# Patient Record
Sex: MALE | Race: WHITE | Employment: FULL TIME | URBAN - METROPOLITAN AREA
[De-identification: names, ages, dates, MRNs, and addresses within clinical notes are randomized per-mention and may not be internally consistent; named-entity substitution may affect disease eponyms.]

---

## 2022-01-29 ENCOUNTER — OFFICE VISIT (OUTPATIENT)
Dept: URGENT CARE | Facility: URGENT CARE | Age: 27
End: 2022-01-29

## 2022-01-29 VITALS
WEIGHT: 175 LBS | TEMPERATURE: 98.3 F | HEART RATE: 62 BPM | OXYGEN SATURATION: 97 % | DIASTOLIC BLOOD PRESSURE: 76 MMHG | SYSTOLIC BLOOD PRESSURE: 116 MMHG | RESPIRATION RATE: 16 BRPM

## 2022-01-29 DIAGNOSIS — L30.9 DERMATITIS: ICD-10-CM

## 2022-01-29 DIAGNOSIS — B35.4 TINEA CORPORIS: Primary | ICD-10-CM

## 2022-01-29 PROCEDURE — 99203 OFFICE O/P NEW LOW 30 MIN: CPT | Performed by: FAMILY MEDICINE

## 2022-01-29 RX ORDER — KETOCONAZOLE 20 MG/G
CREAM TOPICAL 2 TIMES DAILY
Qty: 60 G | Refills: 0 | Status: SHIPPED | OUTPATIENT
Start: 2022-01-29

## 2022-01-29 RX ORDER — TRIAMCINOLONE ACETONIDE 1 MG/G
CREAM TOPICAL 2 TIMES DAILY
Qty: 30 G | Refills: 0 | Status: SHIPPED | OUTPATIENT
Start: 2022-01-29

## 2022-01-29 NOTE — PROGRESS NOTES
SUBJECTIVE:  Chief Complaint   Patient presents with     Derm Problem     rash on back of left upper armx several days, also under his nose and right arm x 10 day - itchy, redness, blotchy     Andrew Moran is a 26 year old male who presents with a chief complaint of rash on arms, nose.    Rash on left upper arm for the past 1 month, has gotten slightly larger.  Has not tried anything except lotion.  Endorsed itchiness.    Developed forearm itchiness and small red spots - these have been present for the past 1 week.  Unsure of any exposure.  Has not tried anything on forearms.    Also has rash around mouth and underneath left nose - present for the past 2 weeks.  Denies any new food products.  Mild itchiness.  Does get a little drier but denies any yellowed mattering    No prior skin issues.    History reviewed. No pertinent past medical history.  No current outpatient medications on file.     Social History     Tobacco Use     Smoking status: Never Smoker     Smokeless tobacco: Never Used   Substance Use Topics     Alcohol use: Yes       ROS:  Review of systems negative except as stated above.    EXAM:   /76 (BP Location: Right arm, Patient Position: Chair, Cuff Size: Adult Regular)   Pulse 62   Temp 98.3  F (36.8  C) (Oral)   Resp 16   Wt 79.4 kg (175 lb)   SpO2 97%   GENERAL APPEARANCE: healthy, alert and no distress  EXTREMITIES: peripheral pulses normal  SKIN: left upper arm - lateral aspect with 2 irregular/oval patches with slightly raised redden border with central clearing and slight scaling.  Bilateral forearms with faint pink macules, scattered with no scaling or vesicles/pustules.  Face - redden patch below left nose/upper lip, smaller similar redden patches below lower lip, slight rough/scaling, no yellowed crusting/mattering, no vesicles or blistering  PSYCH: alert, affect bright      ASSESSMENT/PLAN:   (B35.4) Tinea corporis  (primary encounter diagnosis)  Comment: left upper arm  Plan:  ketoconazole (NIZORAL) 2 % external cream            (L30.9) Dermatitis  Comment: perioral/face  Plan: ketoconazole (NIZORAL) 2 % external cream,         triamcinolone (KENALOG) 0.1 % external cream            Discussed rash concerns and reviewed that left upper arm most likely fungal etiology - RX ketoconazole cream to area.  Reviewed perioral/face most likely dermatitis and recommend to monitor in regards to triggers that may have caused this, RX triamcinolone cream given to apply to area and reviewed that may also benefit from antifungal cream too - okay to apply ketoconazole cream to rash around mouth/face.    Reassurance given in regards to forearm rash, monitor for now.  No concerns for infection/wounds and minimal irritant appearance.  Okay for benadryl to help with itchiness, otherwise okay to take claritin or zyrtec.    Follow up with primary provider if no improvement of symptoms in 2 weeks    Jitendra Sheets MD  January 29, 2022 1:05 PM

## 2022-01-29 NOTE — PATIENT INSTRUCTIONS
"Okay to take benadryl 50 mg every 6 hours as needed for itchiness    Take zyrtec or claritin once a day for \"allergies\"    Apply topical antifungal to rash on left upper arm and face  Okay to apply topical steroid to rash on face    Avoid triggers if able to identify      Patient Education     Ringworm of the Skin     Ringworm is a fungal infection of the skin. Despite the name, a worm doesn't cause it. The cause of ringworm is a fungus that infects the outer layers of the skin.  The medical term for ringworm is tinea. It can affect most parts of your body, although it seems to do better in moist areas of the body and around hair. It can be on almost any part of your body, including:    Arms, hands, legs, chest, feet, and back    Scalp    Beard    Groin    Between the toes  Depending on where it is located, sometimes the name changes. For example:    Tinea capitis (scalp)    Tinea cruris (groin)    Tinea corporis (body)    Tinea pedis (feet)  Causes  Ringworm is very common all over the world, including the [place] It can take less than 1 week up to 2 weeks before you develop the infection after being exposed. So, you may not figure out the exact cause.  It's spread through direct contact with:    An infected person or animal    Infected soil, or objects such as towels, clothing, and moses  Symptoms  At first you might not notice ringworm. Or you may just see a small, red, often raised itchy spot or pimple. Sometimes there may only be one spot. At other times there may be several. Ringworm can look slightly different on different parts of the body, but there are some things are always present:    Irregular, round, oval or ring-shaped, which is why it's called ringworm    Clearer or lighter color at the center, since it spreads from the center of the spot outward    Red or inflamed look    Raised    Itchy    Scaly, dry, or flaky  Home care  Follow these tips to help care for yourself at home:    Leave it alone. Don't " scratch at the rash or pick it. This can increase the chance of infection and scarring.    Take medicine as prescribed. If you were prescribed a cream, apply it exactly as directed. Make sure to put the cream not just on the rash, but also on the skin 1 or 2 inches around it. Medicine by mouth is sometimes needed, particularly for ringworm on the scalp. Take it as directed and until your healthcare provider says to stop. Some ringworm creams are now available without a prescription (over the counter). Talk with your healthcare provider about these, as they may be just as effective but less expensive than prescription medicines in some cases.    Keep it from spreading to others.  Untreated ringworm of the skin is contagious by skin-to-skin contact. An affected child may return to school 2 days after treatment has started.  Prevention  To some degree, prevention depends on what part of your body was affected. In general, the following good hygiene can help.    Clean up after you get dirty or sweaty, or after using a locker room.    When possible, don t share moses and brushes.    Avoid having your skin and feet wet or damp for long periods.    Wear clean, loose-fitting underwear.  Follow-up care  Follow up with your healthcare provider as advised by our staff if the rash does not improve after 10 days of treatment or if the rash spreads to other areas of the body.  When to seek medical care  Call your healthcare provider right away if any of these occur:    Redness around the rash gets worse    Fluid drains from the rash    Fever of 100.4 F (38 C) or higher, or as directed by your healthcare provider  Darren last reviewed this educational content on 8/1/2019 2000-2021 [employer]. All rights reserved. This information is not intended as a substitute for professional medical care. Always follow your healthcare professional's instructions.           Patient Education     Managing Atopic Dermatitis  (Eczema)     After bathing, gently pat your skin dry (don t rub). Apply moisturizer while your skin is still damp.   To manage your symptoms and help reduce the severity and frequency, try these self-care tips:   Caring for your skin    Use a gentle, fragrance-free cleanser (or soap substitute cleanser) for bathing. Rinse well. Pat skin dry.    Take warm, not hot, baths or showers. Try to limit them to no more that 10 to 15 minutes.     Use moisturizer liberally right after you bathe, while your skin is still damp.    Try not to scratch because it will cause more damage to your skin.     Topical, over-the-counter hydrocortisone cream may help control mild symptoms.     Controlling your environment    Stay out of extreme heat or cold.    Stay out of very humid or very dry air.    If your home or office air is very dry, use a humidifier.    Stay away from allergens such as dust that may be in bedding, carpets, plush toys, or rugs.    Know that pet hair and dander can cause flare-ups.    Seeking medical treatment  Another way to keep symptoms under control is to seek medical treatment. Talk with your healthcare provider about the type of treatment that may work best for you. Your provider may prescribe treatments such as:     Treatments to put on the skin daily    Medicines taken by mouth (oral medicines) such as antihistamines, antibiotics, or corticosteroids    In severe cases, you may need shots (injections) to control the symptoms. You may even need antibiotics if skin infections occur.  Treatments don t work the same way for every person. So if your symptoms continue or get worse, ask your healthcare provider about other treatments.   Making lifestyle choices    Manage the stress in your life.    Wear loose-fitting cotton clothing that does not bind or rub your skin.    Don't wear wool or other scratchy fabrics.    Use fragrance-free products.    Next step  Now that you know more about atopic dermatitis, the next  step is up to you. Follow your healthcare provider s treatment plan and your self-care routine. This will help bring atopic dermatitis under control. If your symptoms persist, be sure to let your health care provider know.   Darren last reviewed this educational content on 8/1/2019 2000-2021 [employer]. All rights reserved. This information is not intended as a substitute for professional medical care. Always follow your healthcare professional's instructions.